# Patient Record
Sex: FEMALE | Race: AMERICAN INDIAN OR ALASKA NATIVE | URBAN - NONMETROPOLITAN AREA
[De-identification: names, ages, dates, MRNs, and addresses within clinical notes are randomized per-mention and may not be internally consistent; named-entity substitution may affect disease eponyms.]

---

## 2017-07-30 ENCOUNTER — OFFICE VISIT - GICH (OUTPATIENT)
Dept: FAMILY MEDICINE | Facility: OTHER | Age: 57
End: 2017-07-30

## 2017-07-30 ENCOUNTER — HISTORY (OUTPATIENT)
Dept: FAMILY MEDICINE | Facility: OTHER | Age: 57
End: 2017-07-30

## 2017-07-30 DIAGNOSIS — H10.029 OTHER MUCOPURULENT CONJUNCTIVITIS, UNSPECIFIED EYE: ICD-10-CM

## 2017-12-27 NOTE — PROGRESS NOTES
Patient Information     Patient Name MRN Sex Isatu Machado [id number] Female 1960      Progress Notes by Tete Mack MD at 2017  9:00 AM     Author:  Tete Mack MD Service:  (none) Author Type:  Physician     Filed:  2017  9:42 AM Encounter Date:  2017 Status:  Signed     :  Tete Mack MD (Physician)            SUBJECTIVE:   56 y.o. female with burning, redness, discharge and mattering in left eye for 2 days.  No other symptoms.  No significant prior ophthalmological history. No change in visual acuity, mild  photophobia, no severe eye pain.Wears contacts that she leaves in for 30 day intervals and left it in yesterday despite some redness and used visine drops so it looked better.She did sleep in her contacts and took them out this morning and somewhat difficult to remove. Has left them out.      OBJECTIVE:     Patient appears well.   Eyes: left eye with findings of typical conjunctivitis noted; erythema and discharge. PERRLA, no foreign body noted. No periorbital cellulitis. The corneas are clear.     ASSESSMENT:   Conjunctivitis - probably bacterial left only.     PLAN:   Antibiotic drops per order.   Hygiene discussed.   Leave contact lenses out. Due to long wearing contacts she is more at risk for bacterial infection of eye and instructed to see eye doctor if not improving in 24-48 hours.  Tete Mack MD  9:40 AM 2017

## 2017-12-29 NOTE — PATIENT INSTRUCTIONS
Patient Information     Patient Name MRN Isatu Martinez [id number] Female 1960      Patient Instructions by Tete Mack MD at 2017  9:30 AM     Author:  Tete Mack MD Service:  (none) Author Type:  Physician     Filed:  2017  9:30 AM Encounter Date:  2017 Status:  Signed     :  Tete Mack MD (Physician)               Index [place] Related topics   Viral or Bacterial Conjunctivitis (Pinkeye)   ________________________________________________________________________  KEY POINTS    Viral or bacterial conjunctivitis is infection, redness, and swelling of the clear membrane that lines the inside of your eyelids and covers the white of your eye.    Viral conjunctivitis will usually go away on its own without treatment. However, your healthcare provider may prescribe eye drops to help control your symptoms.    For bacterial conjunctivitis, your healthcare provider will prescribe antibiotic eye drops.    To keep from getting conjunctivitis from someone who has it, or to keep from spreading it to others, wash your hands often, and avoid close contact with people until your symptoms improve.  ________________________________________________________________________  What is conjunctivitis?   Conjunctivitis is redness and swelling of the clear membrane that lines the inside of your eyelids and covers the white of your eye. This membrane is called the conjunctiva. This redness and swelling is also called pinkeye.   What is the cause?   This type of conjunctivitis is an infection caused by viruses or bacteria. These germs can be spread easily by coughing or sneezing, or by touching something with your hands and then touching your eyes. It can also be caused by improper cleaning of soft contact lenses.  What are the symptoms?   Symptoms may include:    Red, itchy, swollen or scratchy eyes    Sensitivity to light    Pus or watery discharge, which can cause crusty  eyelashes  How is it diagnosed?   Your healthcare provider will ask about your symptoms, medical history, and activities, and examine your eyes. He or she will also check for enlarged lymph nodes near your ear and jaw, which may be a sign of an infection in your body. A sample of the pus from your eye may be sent to a lab to check for the cause.  How is it treated?   Viral conjunctivitis will usually go away on its own without treatment. However, your healthcare provider may prescribe eye drops to help control your symptoms. Anti-allergy pills or eye drops may also relieve the itching and redness. Viral conjunctivitis usually gets worse at first, then gets better in 3 to 10 days. If only one eye is affected at first, it may spread to the other eye later. Usually, if both eyes are affected, the first eye has worse conjunctivitis than the second.  For bacterial conjunctivitis, your healthcare provider will prescribe antibiotic eye drops. With antibiotics, the symptoms should improve in a couple of days. It is important to avoid close contact with people until you have used the antibiotics for 24 hours and your eye does not have a lot of pus.   If you wear contact lenses, your provider may tell you to stop wearing them until your infection is gone. Wearing contact lenses while you have conjunctivitis may make the infection last longer. Wearing contacts while you have conjunctivitis may also damage your cornea, which is the clear outer layer on the front of your eye, and cause severe vision problems. Your provider may ask you to throw away your current contact lenses and lens case.  How can I take care of myself?   Follow the full course of treatment your healthcare provider prescribes. Ask your healthcare provider:    How and when you will get your test results    How long it will take to recover    If there are activities you should avoid and when you can return to your normal activities    How to take care of  yourself at home    What symptoms or problems you should watch for and what to do if you have them  Make sure you know when you should come back for a checkup. Keep all appointments for provider visits or tests.  How can I help prevent conjunctivitis?   To keep from getting conjunctivitis from someone who has it, or to keep from spreading it to others, follow these guidelines:    Wash your hands often. Do not touch or rub your eyes.    Do not share eye makeup or cosmetics with anyone. When you have conjunctivitis, throw out all eye makeup you have been using.    Never use eye medicine that has been prescribed for someone else.    Do not share towels, washcloths, pillows, or sheets with anyone. If one of your eyes is affected but not the other, use a separate towel for each eye.    Avoid swimming while you have conjunctivitis.    Avoid close contact with people until your symptoms improve. Depending on your job, you may be asked to take time off from work until the conjunctivitis is healed.  Reviewed for medical accuracy by faculty at the [facility] at [facility]. Web site: [email or web address]  Developed by [employer].  Adult Advisor 2016.3 published by [employer].  Last modified: 2016-03-23  Last reviewed: 2015-09-21  This content is reviewed periodically and is subject to change as new health information becomes available. The information is intended to inform and educate and is not a replacement for medical evaluation, advice, diagnosis or treatment by a healthcare professional.  References   Adult Advisor 2016.3 Index    Copyright   2016 [employer], a division of [employer] All rights reserved.

## 2017-12-30 NOTE — NURSING NOTE
Patient Information     Patient Name MRN Sex Isatu aMchado [id number] Female 1960      Nursing Note by Alisha Teresa at 2017  9:00 AM     Author:  Alisha Teresa Service:  (none) Author Type:  (none)     Filed:  2017  9:24 AM Encounter Date:  2017 Status:  Signed     :  Alisha Teresa            Patient presents to clinic with possible pink eye.  Alisha Da Silva ....................  2017   9:17 AM

## 2018-01-27 VITALS
RESPIRATION RATE: 16 BRPM | TEMPERATURE: 97.7 F | HEART RATE: 64 BPM | BODY MASS INDEX: 25.99 KG/M2 | SYSTOLIC BLOOD PRESSURE: 130 MMHG | HEIGHT: 65 IN | WEIGHT: 156 LBS | DIASTOLIC BLOOD PRESSURE: 78 MMHG

## 2018-03-05 ENCOUNTER — DOCUMENTATION ONLY (OUTPATIENT)
Dept: FAMILY MEDICINE | Facility: OTHER | Age: 58
End: 2018-03-05

## 2018-03-05 RX ORDER — CIPROFLOXACIN HYDROCHLORIDE 3.5 MG/ML
2 SOLUTION/ DROPS TOPICAL
COMMUNITY
Start: 2017-07-30

## 2020-03-28 ENCOUNTER — NURSE TRIAGE (OUTPATIENT)
Dept: NURSING | Facility: CLINIC | Age: 60
End: 2020-03-28

## 2020-03-28 NOTE — TELEPHONE ENCOUNTER
"  Call from pt       She called local ED and was directed to call us to discuss current sx       CC:  \"L sided chest discomfort\" off and on for the past 1-2 days     Yes some \"tingling\"  In her L arm as well       This seems to occur mainly at night but not she has had this occur this am about 4am       No SOB   No jaw pain   No N/V       No h/o of CV disease        No F/C/SOB   No travel   No (+) contact         Directed to ED - she will head there now       I called her local ED and spoke with ED staff        dominick yap rn                  Reason for Disposition    Pain also present in shoulder(s) or arm(s) or jaw  (Exception: pain is clearly made worse by movement)    Additional Information    Negative: Severe difficulty breathing (e.g., struggling for each breath, speaks in single words)    Negative: Difficult to awaken or acting confused (e.g., disoriented, slurred speech)    Negative: Shock suspected (e.g., cold/pale/clammy skin, too weak to stand, low BP, rapid pulse)    Negative: [1] Chest pain lasts > 5 minutes AND [2] history of heart disease  (i.e., heart attack, bypass surgery, angina, angioplasty, CHF; not just a heart murmur)    Negative: [1] Chest pain lasts > 5 minutes AND [2] described as crushing, pressure-like, or heavy    Negative: Followed a chest injury    Negative: [1] Chest pain lasts > 5 minutes AND [2] age > 50    Negative: [1] Chest pain lasts > 5 minutes AND [2] age > 30 AND [3] at least one cardiac risk factor (i.e., hypertension, diabetes, obesity, smoker or strong family history of heart disease)    Negative: [1] Chest pain lasts > 5 minutes AND [2] not relieved with nitroglycerin    Negative: Passed out (i.e., lost consciousness, collapsed and was not responding)    Negative: Heart beating < 50 beats per minute OR > 140 beats per minute    Negative: Visible sweat on face or sweat dripping down face    Negative: Sounds like a life-threatening emergency to the triager    Negative: " "SEVERE chest pain    Negative: [1] Intermittent  chest pain or \"angina\" AND [2] increasing in severity or frequency  (Exception: pains lasting a few seconds)    Protocols used: CHEST PAIN-A-AH      "